# Patient Record
Sex: MALE | Race: WHITE | URBAN - METROPOLITAN AREA
[De-identification: names, ages, dates, MRNs, and addresses within clinical notes are randomized per-mention and may not be internally consistent; named-entity substitution may affect disease eponyms.]

---

## 2017-08-12 ENCOUNTER — HOSPITAL ENCOUNTER (OUTPATIENT)
Dept: ONCOLOGY | Facility: HOSPITAL | Age: 77
End: 2017-08-13
Attending: FAMILY MEDICINE | Admitting: FAMILY MEDICINE

## 2017-08-12 LAB
ABS NEUT (OLG): 7.37 X10(3)/MCL (ref 2.1–9.2)
ALBUMIN SERPL-MCNC: 3.1 GM/DL (ref 3.4–5)
ALBUMIN/GLOB SERPL: 1.1 {RATIO}
ALP SERPL-CCNC: 68 UNIT/L (ref 50–136)
ALT SERPL-CCNC: 11 UNIT/L (ref 12–78)
APPEARANCE, UA: ABNORMAL
AST SERPL-CCNC: 9 UNIT/L (ref 15–37)
BACTERIA SPEC CULT: ABNORMAL /HPF
BASOPHILS # BLD AUTO: 0 X10(3)/MCL (ref 0–0.2)
BASOPHILS NFR BLD AUTO: 0 %
BILIRUB SERPL-MCNC: 0.4 MG/DL (ref 0.2–1)
BILIRUB UR QL STRIP: NEGATIVE
BILIRUBIN DIRECT+TOT PNL SERPL-MCNC: 0.1 MG/DL (ref 0–0.2)
BILIRUBIN DIRECT+TOT PNL SERPL-MCNC: 0.3 MG/DL (ref 0–0.8)
BUN SERPL-MCNC: 12 MG/DL (ref 7–18)
CALCIUM SERPL-MCNC: 8.2 MG/DL (ref 8.5–10.1)
CHLORIDE SERPL-SCNC: 109 MMOL/L (ref 98–107)
CO2 SERPL-SCNC: 26 MMOL/L (ref 21–32)
COLOR UR: ABNORMAL
CREAT SERPL-MCNC: 0.73 MG/DL (ref 0.7–1.3)
EOSINOPHIL # BLD AUTO: 0.1 X10(3)/MCL (ref 0–0.9)
EOSINOPHIL NFR BLD AUTO: 1 %
ERYTHROCYTE [DISTWIDTH] IN BLOOD BY AUTOMATED COUNT: 15.2 % (ref 11.5–17)
GLOBULIN SER-MCNC: 2.7 GM/DL (ref 2.4–3.5)
GLUCOSE (UA): NEGATIVE
GLUCOSE SERPL-MCNC: 119 MG/DL (ref 74–106)
GROUP & RH: NORMAL
HCT VFR BLD AUTO: 32.4 % (ref 42–52)
HGB BLD-MCNC: 10.6 GM/DL (ref 14–18)
HGB UR QL STRIP: NEGATIVE
KETONES UR QL STRIP: NEGATIVE
LEUKOCYTE ESTERASE UR QL STRIP: ABNORMAL
LYMPHOCYTES # BLD AUTO: 0.8 X10(3)/MCL (ref 0.6–4.6)
LYMPHOCYTES NFR BLD AUTO: 9 %
MCH RBC QN AUTO: 32 PG (ref 27–31)
MCHC RBC AUTO-ENTMCNC: 32.7 GM/DL (ref 33–36)
MCV RBC AUTO: 97.9 FL (ref 80–94)
MONOCYTES # BLD AUTO: 0.5 X10(3)/MCL (ref 0.1–1.3)
MONOCYTES NFR BLD AUTO: 6 %
NEUTROPHILS # BLD AUTO: 7.37 X10(3)/MCL (ref 1.4–7.9)
NEUTROPHILS NFR BLD AUTO: 84 %
NITRITE UR QL STRIP: NEGATIVE
PH UR STRIP: 6 [PH] (ref 5–9)
PLATELET # BLD AUTO: 138 X10(3)/MCL (ref 130–400)
PMV BLD AUTO: 8.3 FL (ref 9.4–12.4)
POTASSIUM SERPL-SCNC: 3.6 MMOL/L (ref 3.5–5.1)
PRODUCT READY: NORMAL
PROT SERPL-MCNC: 5.8 GM/DL (ref 6.4–8.2)
PROT UR QL STRIP: NEGATIVE
RBC # BLD AUTO: 3.31 X10(6)/MCL (ref 4.7–6.1)
RBC #/AREA URNS HPF: 232 /HPF (ref 0–2)
SODIUM SERPL-SCNC: 143 MMOL/L (ref 136–145)
SP GR UR STRIP: 1.02 (ref 1–1.03)
SQUAMOUS EPITHELIAL, UA: ABNORMAL
TROPONIN I SERPL-MCNC: <0.02 NG/ML (ref 0.02–0.49)
UROBILINOGEN UR STRIP-ACNC: 0.2
WBC # SPEC AUTO: 8.8 X10(3)/MCL (ref 4.5–11.5)
WBC #/AREA URNS HPF: ABNORMAL /HPF

## 2017-08-13 LAB
ABS NEUT (OLG): 3.53 X10(3)/MCL (ref 2.1–9.2)
ALBUMIN SERPL-MCNC: 2.8 GM/DL (ref 3.4–5)
ALBUMIN/GLOB SERPL: 1.2 {RATIO}
ALP SERPL-CCNC: 63 UNIT/L (ref 50–136)
ALT SERPL-CCNC: 9 UNIT/L (ref 12–78)
AST SERPL-CCNC: 13 UNIT/L (ref 15–37)
BASOPHILS # BLD AUTO: 0 X10(3)/MCL (ref 0–0.2)
BASOPHILS NFR BLD AUTO: 0 %
BILIRUB SERPL-MCNC: 0.5 MG/DL (ref 0.2–1)
BILIRUBIN DIRECT+TOT PNL SERPL-MCNC: 0.1 MG/DL (ref 0–0.2)
BILIRUBIN DIRECT+TOT PNL SERPL-MCNC: 0.4 MG/DL (ref 0–0.8)
BUN SERPL-MCNC: 7 MG/DL (ref 7–18)
CALCIUM SERPL-MCNC: 7.9 MG/DL (ref 8.5–10.1)
CHLORIDE SERPL-SCNC: 110 MMOL/L (ref 98–107)
CO2 SERPL-SCNC: 26 MMOL/L (ref 21–32)
CREAT SERPL-MCNC: 0.52 MG/DL (ref 0.7–1.3)
EOSINOPHIL # BLD AUTO: 0.1 X10(3)/MCL (ref 0–0.9)
EOSINOPHIL NFR BLD AUTO: 2 %
ERYTHROCYTE [DISTWIDTH] IN BLOOD BY AUTOMATED COUNT: 15 % (ref 11.5–17)
GLOBULIN SER-MCNC: 2.3 GM/DL (ref 2.4–3.5)
GLUCOSE SERPL-MCNC: 87 MG/DL (ref 74–106)
HCT VFR BLD AUTO: 32 % (ref 42–52)
HGB BLD-MCNC: 10.1 GM/DL (ref 14–18)
LYMPHOCYTES # BLD AUTO: 1 X10(3)/MCL (ref 0.6–4.6)
LYMPHOCYTES NFR BLD AUTO: 19 %
MCH RBC QN AUTO: 30 PG (ref 27–31)
MCHC RBC AUTO-ENTMCNC: 31.6 GM/DL (ref 33–36)
MCV RBC AUTO: 95 FL (ref 80–94)
MONOCYTES # BLD AUTO: 0.4 X10(3)/MCL (ref 0.1–1.3)
MONOCYTES NFR BLD AUTO: 8 %
NEUTROPHILS # BLD AUTO: 3.53 X10(3)/MCL (ref 2.1–9.2)
NEUTROPHILS NFR BLD AUTO: 70 %
PLATELET # BLD AUTO: 132 X10(3)/MCL (ref 130–400)
PMV BLD AUTO: 8.6 FL (ref 9.4–12.4)
POTASSIUM SERPL-SCNC: 3.9 MMOL/L (ref 3.5–5.1)
PROT SERPL-MCNC: 5.1 GM/DL (ref 6.4–8.2)
RBC # BLD AUTO: 3.37 X10(6)/MCL (ref 4.7–6.1)
SODIUM SERPL-SCNC: 143 MMOL/L (ref 136–145)
WBC # SPEC AUTO: 5 X10(3)/MCL (ref 4.5–11.5)

## 2017-08-28 ENCOUNTER — HISTORICAL (OUTPATIENT)
Dept: ENDOSCOPY | Facility: HOSPITAL | Age: 77
End: 2017-08-28

## 2017-10-30 ENCOUNTER — HISTORICAL (OUTPATIENT)
Dept: RADIOLOGY | Facility: HOSPITAL | Age: 77
End: 2017-10-30

## 2017-10-30 LAB — POC CREATININE: 0.7 MG/DL (ref 0.6–1.3)

## 2019-05-22 ENCOUNTER — HISTORICAL (OUTPATIENT)
Dept: ADMINISTRATIVE | Facility: HOSPITAL | Age: 79
End: 2019-05-22

## 2019-05-22 LAB
ABS NEUT (OLG): 3.59 X10(3)/MCL (ref 2.1–9.2)
ALBUMIN SERPL-MCNC: 2.9 GM/DL (ref 3.4–5)
ALBUMIN/GLOB SERPL: 1 RATIO (ref 1–2)
ALP SERPL-CCNC: 125 UNIT/L (ref 45–117)
ALT SERPL-CCNC: 12 UNIT/L (ref 16–61)
AST SERPL-CCNC: 10 UNIT/L (ref 15–37)
BASOPHILS # BLD AUTO: 0.02 X10(3)/MCL (ref 0–0.2)
BASOPHILS NFR BLD AUTO: 0.3 % (ref 0–0.9)
BILIRUB SERPL-MCNC: 0.3 MG/DL (ref 0.2–1)
BILIRUBIN DIRECT+TOT PNL SERPL-MCNC: 0.11 MG/DL (ref 0–0.2)
BILIRUBIN DIRECT+TOT PNL SERPL-MCNC: 0.19 MG/DL (ref 0–1)
BUN SERPL-MCNC: 19 MG/DL (ref 7–18)
CALCIUM SERPL-MCNC: 8 MG/DL (ref 8.5–10.1)
CHLORIDE SERPL-SCNC: 113 MMOL/L (ref 98–107)
CO2 SERPL-SCNC: 29 MMOL/L (ref 21–32)
CREAT SERPL-MCNC: 0.8 MG/DL (ref 0.7–1.3)
DEPRECATED CALCIDIOL+CALCIFEROL SERPL-MC: 30.65 NG/ML (ref 30–80)
EOSINOPHIL # BLD AUTO: 0.22 X10(3)/MCL (ref 0–0.9)
EOSINOPHIL NFR BLD AUTO: 3.8 % (ref 0–6.5)
ERYTHROCYTE [DISTWIDTH] IN BLOOD BY AUTOMATED COUNT: 16 % (ref 11.5–17)
GLOBULIN SER-MCNC: 3 GM/DL (ref 2–4)
GLUCOSE SERPL-MCNC: 86 MG/DL (ref 74–106)
HCT VFR BLD AUTO: 31 % (ref 42–52)
HGB BLD-MCNC: 9.6 GM/DL (ref 14–18)
IMM GRANULOCYTES # BLD AUTO: 0.04 10*3/UL (ref 0–0.02)
IMM GRANULOCYTES NFR BLD AUTO: 0.7 % (ref 0–0.43)
LYMPHOCYTES # BLD AUTO: 1.48 X10(3)/MCL (ref 0.6–4.6)
LYMPHOCYTES NFR BLD AUTO: 25.3 % (ref 16.2–38.3)
MCH RBC QN AUTO: 30.9 PG (ref 27–31)
MCHC RBC AUTO-ENTMCNC: 31 GM/DL (ref 33–36)
MCV RBC AUTO: 99.7 FL (ref 80–94)
MONOCYTES # BLD AUTO: 0.49 X10(3)/MCL (ref 0.1–1.3)
MONOCYTES NFR BLD AUTO: 8.4 % (ref 4.7–11.3)
NEUTROPHILS # BLD AUTO: 3.59 X10(3)/MCL (ref 2.1–9.2)
NEUTROPHILS NFR BLD AUTO: 61.5 % (ref 49.1–73.4)
NRBC BLD AUTO-RTO: 0 % (ref 0–0.2)
PLATELET # BLD AUTO: 154 X10(3)/MCL (ref 130–400)
PMV BLD AUTO: 8.7 FL (ref 7.4–10.4)
POTASSIUM SERPL-SCNC: 3.6 MMOL/L (ref 3.5–5.1)
PREALB SERPL-MCNC: 18.7 MG/DL (ref 20–40)
PROT SERPL-MCNC: 6.1 GM/DL (ref 6.4–8.2)
RBC # BLD AUTO: 3.11 X10(6)/MCL (ref 4.7–6.1)
SODIUM SERPL-SCNC: 147 MMOL/L (ref 136–145)
TSH SERPL-ACNC: 2.11 MIU/ML (ref 0.36–3.74)
WBC # SPEC AUTO: 5.8 X10(3)/MCL (ref 4.5–11.5)

## 2019-06-12 LAB
APPEARANCE, UA: ABNORMAL
BACTERIA SPEC CULT: ABNORMAL /HPF
BILIRUB UR QL STRIP: NEGATIVE
COLOR UR: YELLOW
GLUCOSE (UA): NEGATIVE
HGB UR QL STRIP: ABNORMAL
KETONES UR QL STRIP: ABNORMAL
LEUKOCYTE ESTERASE UR QL STRIP: ABNORMAL
NITRITE UR QL STRIP: POSITIVE
PH UR STRIP: 6 [PH] (ref 5–7)
PROT UR QL STRIP: ABNORMAL
RBC #/AREA URNS HPF: ABNORMAL /HPF
SP GR UR STRIP: 1.02 (ref 1–1.03)
SQUAMOUS EPITHELIAL, UA: ABNORMAL /LPF
UROBILINOGEN UR STRIP-ACNC: NEGATIVE
WBC #/AREA URNS HPF: ABNORMAL /HPF

## 2019-06-13 ENCOUNTER — HISTORICAL (OUTPATIENT)
Dept: ADMINISTRATIVE | Facility: HOSPITAL | Age: 79
End: 2019-06-13

## 2019-07-01 ENCOUNTER — HISTORICAL (OUTPATIENT)
Dept: ADMINISTRATIVE | Facility: HOSPITAL | Age: 79
End: 2019-07-01

## 2019-07-01 LAB
ABS NEUT (OLG): 4.49 X10(3)/MCL (ref 2.1–9.2)
BASOPHILS # BLD AUTO: 0.03 X10(3)/MCL (ref 0–0.2)
BASOPHILS NFR BLD AUTO: 0.5 % (ref 0–0.9)
EOSINOPHIL # BLD AUTO: 0.11 X10(3)/MCL (ref 0–0.9)
EOSINOPHIL NFR BLD AUTO: 1.7 % (ref 0–6.5)
ERYTHROCYTE [DISTWIDTH] IN BLOOD BY AUTOMATED COUNT: 14.4 % (ref 11.5–17)
HCT VFR BLD AUTO: 33.1 % (ref 42–52)
HGB BLD-MCNC: 10.5 GM/DL (ref 14–18)
IMM GRANULOCYTES # BLD AUTO: 0.02 10*3/UL (ref 0–0.02)
IMM GRANULOCYTES NFR BLD AUTO: 0.3 % (ref 0–0.43)
LYMPHOCYTES # BLD AUTO: 1.42 X10(3)/MCL (ref 0.6–4.6)
LYMPHOCYTES NFR BLD AUTO: 21.4 % (ref 16.2–38.3)
MCH RBC QN AUTO: 30.9 PG (ref 27–31)
MCHC RBC AUTO-ENTMCNC: 31.7 GM/DL (ref 33–36)
MCV RBC AUTO: 97.4 FL (ref 80–94)
MONOCYTES # BLD AUTO: 0.57 X10(3)/MCL (ref 0.1–1.3)
MONOCYTES NFR BLD AUTO: 8.6 % (ref 4.7–11.3)
NEUTROPHILS # BLD AUTO: 4.49 X10(3)/MCL (ref 2.1–9.2)
NEUTROPHILS NFR BLD AUTO: 67.5 % (ref 49.1–73.4)
NRBC BLD AUTO-RTO: 0 % (ref 0–0.2)
PLATELET # BLD AUTO: 187 X10(3)/MCL (ref 130–400)
PMV BLD AUTO: 8.9 FL (ref 7.4–10.4)
RBC # BLD AUTO: 3.4 X10(6)/MCL (ref 4.7–6.1)
WBC # SPEC AUTO: 6.6 X10(3)/MCL (ref 4.5–11.5)

## 2019-09-25 ENCOUNTER — HISTORICAL (OUTPATIENT)
Dept: ADMINISTRATIVE | Facility: HOSPITAL | Age: 79
End: 2019-09-25

## 2019-09-25 LAB
ABS NEUT (OLG): 5.99 X10(3)/MCL (ref 2.1–9.2)
ALBUMIN SERPL-MCNC: 3.3 GM/DL (ref 3.4–5)
ALBUMIN/GLOB SERPL: 1.1 RATIO (ref 1–2)
ALP SERPL-CCNC: 109 UNIT/L (ref 45–117)
ALT SERPL-CCNC: 22 UNIT/L (ref 16–61)
AST SERPL-CCNC: 17 UNIT/L (ref 15–37)
BASOPHILS # BLD AUTO: 0.03 X10(3)/MCL (ref 0–0.2)
BASOPHILS NFR BLD AUTO: 0.4 % (ref 0–0.9)
BILIRUB SERPL-MCNC: 0.3 MG/DL (ref 0.2–1)
BILIRUBIN DIRECT+TOT PNL SERPL-MCNC: 0.11 MG/DL (ref 0–0.2)
BILIRUBIN DIRECT+TOT PNL SERPL-MCNC: 0.19 MG/DL (ref 0–1)
BUN SERPL-MCNC: 20 MG/DL (ref 7–18)
CALCIUM SERPL-MCNC: 8.9 MG/DL (ref 8.5–10.1)
CHLORIDE SERPL-SCNC: 106 MMOL/L (ref 98–107)
CO2 SERPL-SCNC: 31 MMOL/L (ref 21–32)
CREAT SERPL-MCNC: 0.84 MG/DL (ref 0.7–1.3)
EOSINOPHIL # BLD AUTO: 0.15 X10(3)/MCL (ref 0–0.9)
EOSINOPHIL NFR BLD AUTO: 2 % (ref 0–6.5)
ERYTHROCYTE [DISTWIDTH] IN BLOOD BY AUTOMATED COUNT: 14.3 % (ref 11.5–17)
GLOBULIN SER-MCNC: 3 GM/DL (ref 2–4)
GLUCOSE SERPL-MCNC: 88 MG/DL (ref 74–106)
HCT VFR BLD AUTO: 32.1 % (ref 42–52)
HGB BLD-MCNC: 10.1 GM/DL (ref 14–18)
IMM GRANULOCYTES # BLD AUTO: 0.04 10*3/UL (ref 0–0.02)
IMM GRANULOCYTES NFR BLD AUTO: 0.5 % (ref 0–0.43)
LYMPHOCYTES # BLD AUTO: 0.9 X10(3)/MCL (ref 0.6–4.6)
LYMPHOCYTES NFR BLD AUTO: 11.9 % (ref 16.2–38.3)
MCH RBC QN AUTO: 30.5 PG (ref 27–31)
MCHC RBC AUTO-ENTMCNC: 31.5 GM/DL (ref 33–36)
MCV RBC AUTO: 97 FL (ref 80–94)
MONOCYTES # BLD AUTO: 0.47 X10(3)/MCL (ref 0.1–1.3)
MONOCYTES NFR BLD AUTO: 6.2 % (ref 4.7–11.3)
NEUTROPHILS # BLD AUTO: 5.99 X10(3)/MCL (ref 2.1–9.2)
NEUTROPHILS NFR BLD AUTO: 79 % (ref 49.1–73.4)
NRBC BLD AUTO-RTO: 0 % (ref 0–0.2)
PLATELET # BLD AUTO: 189 X10(3)/MCL (ref 130–400)
PMV BLD AUTO: 8.4 FL (ref 7.4–10.4)
POTASSIUM SERPL-SCNC: 4 MMOL/L (ref 3.5–5.1)
PROT SERPL-MCNC: 6.3 GM/DL (ref 6.4–8.2)
RBC # BLD AUTO: 3.31 X10(6)/MCL (ref 4.7–6.1)
SODIUM SERPL-SCNC: 140 MMOL/L (ref 136–145)
WBC # SPEC AUTO: 7.6 X10(3)/MCL (ref 4.5–11.5)

## 2022-04-11 ENCOUNTER — HISTORICAL (OUTPATIENT)
Dept: ADMINISTRATIVE | Facility: HOSPITAL | Age: 82
End: 2022-04-11

## 2022-04-27 VITALS
BODY MASS INDEX: 20.18 KG/M2 | DIASTOLIC BLOOD PRESSURE: 64 MMHG | WEIGHT: 133.19 LBS | HEIGHT: 68 IN | SYSTOLIC BLOOD PRESSURE: 90 MMHG

## 2022-04-30 NOTE — DISCHARGE SUMMARY
DISCHARGE DATE:  08/13/2017    HOSPITAL COURSE:  The patient was placed on outpatient observation 08/12/17 and I am discharging him on 08/13/17.  He is a 76-year-old man who had an episode of hypotension and dizziness at [facility].  He has a recent history of GI bleed with gastric ulcer.  Every since his arrival in the emergency room, throughout overnight observation, and this morning, his blood pressures have been fine; systolics in the 140s to 160s.  He has had no further episodes of hypotension, no signs of symptoms of GI bleeding and hemoglobin and hematocrit have remained steady at 10 and 30 respectively.  Vital signs have been stable.  He is being discharged back to [facility].    FINAL DIAGNOSES:    1. Episodic hypotension and dizziness, which is transient and resolved.  Etiology undetermined.   2. Pre-existing gastric ulcer.  3. Anemia.  4. Parkinson's disease.    DISCHARGE INSTRUCTIONS:  He is being discharged back on all of his usual medications with no new orders.  He has a history of urinary retention and was unable to void, therefore a Azevedo catheter was reinserted.  I am leaving that in place and they can try to remove it again once he gets back to the nursing home.    MEDICATIONS:    1. Amlodipine, 5 mg a day.  2. Carbidopa/levodopa, 25/100, one twice a day.  3. Diclofenac, 1% topical gel prn.  4. Lisinopril, 10 mg daily.  5. Protonix, 40 mg daily.  6. Ropurinol, 0.25mg once a day at bedtime.  7. Sucralfate suspension, 10 mL four times a day before meals and bedtime.  8. Flomax, 0.4 mg daily.  9.   Restoril, 7.5 mg prn at bedtime.  10. Tramadol, 50 mg every 4 hours prn pain.        ______________________________  L. MD ALEN Leyva/GOVIND  DD:  08/13/2017  Time:  10:00AM  DT:  08/13/2017  Time:  02:24PM  Job #:  [id number]    cc: [facility]   Carlos Aguila MD

## 2022-04-30 NOTE — ED PROVIDER NOTES
Patient:   Patric Posadas            MRN: [id number]            FIN: [id number]               Age:   76 years     Sex:  Male     :  1940   Associated Diagnoses:   Hypotension; Hypotension; Dizziness; Anemia   Author:   Guicho LOGAN, Sudhakar BRIAN      Basic Information   Time seen: Date & time 2017 11:37:00.   History source: Patient.   Arrival mode: Ambulance.   History limitation: None.   Additional information: Chief Complaint from Nursing Triage Note : Chief Complaint   2017 11:26 CDT      Chief Complaint           dizzy, low blood pressure 82/46, cbg 127, pt reports recent admit for GI bleed and another UTI  .      History of Present Illness   The patient presents with dizziness, 75 YO WM in by EMS with hx of parkinsons dementia, HTN, rectal bleed admitted here 17 presents with weakness and dizziness at nursing home this am. EMS reports hypotensive 80/40. recieved 250 NS, BP now 122/70. . Yakelin Ross, Np SORt NOTE and   I, Dr. Lindo, assumed care of this patient upon entering the room at 1136.    76 year old male with history of HTN, Decubitus, Parkinson's, Dementia, and GI bleed 1 month ago presents to ED via EMS from nursing home after experiencing lightheadedness and dizziness during PT. EMS reports BP 80/40 initially and . Patient denies abdominal pain or melena. .  The onset was just prior to arrival.  The course/duration of symptoms is constant.  The character of symptoms is lightheaded and Dizziness.  The degree at present is moderate.  The exacerbating factor is none.  The relieving factor is none.  Risk factors consist of hypertension and recent hospitalization (GI bleed).  Prior episodes: none.  Therapy today: emergency medical services.  Associated symptoms: No melena and denies abdominal pain.  Additional history: none.        Review of Systems   Constitutional symptoms:  Negative except as documented in HPI.   Skin symptoms:  Negative except as  documented in HPI.   Eye symptoms:  Negative except as documented in HPI.   ENMT symptoms:  Negative except as documented in HPI.   Respiratory symptoms:  Negative except as documented in HPI.   Cardiovascular symptoms:  Negative except as documented in HPI.   Gastrointestinal symptoms:  No melena, No abdominal pain,    Genitourinary symptoms:  Negative except as documented in HPI.   Musculoskeletal symptoms:  Negative except as documented in HPI.   Neurologic symptoms:  Dizziness, Lightheadedness.    Psychiatric symptoms:  Negative except as documented in HPI.   Endocrine symptoms:  Negative except as documented in HPI.   Hematologic/Lymphatic symptoms:  Negative except as documented in HPI.   Allergy/immunologic symptoms:  Negative except as documented in HPI.             Additional review of systems information: All other systems reviewed and otherwise negative.      Health Status   Allergies: No known allergies.   Medications:  (Selected)   Inpatient Medications  Ordered  Normal Saline (0.9% NS) IV 1,000 mL: 1,000 mL, 1,000 mL, IV, 500 mL/hr, start date 08/12/17 11:36:00 CDT  Tylenol: 1,000 mg, form: Tab, Oral, Once, first dose 08/07/17 12:58:00 CDT, stop date 08/07/17 12:58:00 CDT, STAT, 24  Documented Medications  Documented  Carafate 1 g/10 mL oral suspension: 1 gm = 10 mL, Oral, AC & HS, 0 Refill(s)  Desitin 40% topical ointment: 1 joel, TOP, BID, 0 Refill(s)  Flomax 0.4 mg oral capsule: 0.4 mg = 1 cap(s), Oral, Daily, 0 Refill(s)  LISINOPRIL 10 MG TABS: 10 mg = 1 tab(s), Oral, Daily  Norvasc 5 mg oral tablet: 5 mg = 1 tab(s), Oral, Daily, 0 Refill(s)  Protonix 40 mg ORAL enteric coated tablet: 40 mg = 1 tab(s), Oral, Daily, 0 Refill(s)  Restoril 7.5 mg oral capsule: 7.5 mg = 1 cap(s), Oral, Once a day (at bedtime), PRN PRN as needed for sleep, 0 Refill(s)  Sinemet 25 mg-100 mg oral tablet: 1 tab(s), Oral, BIDAC, 0 Refill(s)  Zeasorb-AF: 1 joel, TOP, BID, 0 Refill(s)  bisacodyl 10 mg rectal solution: 10 mg = 1  EA, MD, Daily, PRN PRN constipation, # 37.5 mL, 0 Refill(s)  diclofenac 1% topical gel: TOP, BID, 0 Refill(s)  ropinirole 0.25 mg oral tablet: 0.25 mg = 1 tab(s), Oral, Once a day (at bedtime), 0 Refill(s)  traMADol 50 mg oral tablet: 50 mg = 1 tab(s), Oral, q4hr, PRN PRN for pain, 0 Refill(s).   Immunizations: Per nurse's notes.      Past Medical/ Family/ Social History   Medical history:    No active or resolved past medical history items have been selected or recorded., Decubitus  Dementia  Parkinson's   HTN  GI bleed.   Surgical history:    Esophagogastroduodenoscopy on 7/3/2017 at 76 Years.  Comments:  7/3/2017 12:18 - Tyree Smith RN  auto-populated from documented surgical case  Biopsy Gastrointestinal on 7/3/2017 at 76 Years.  Comments:  7/3/2017 12:Tyree Blanchard RN  auto-populated from documented surgical case  Laparoscopic sigmoid colectomy ([id number])..   Family history: Not significant.   Social history: Alcohol use: Denies, Tobacco use: Denies, Drug use: Denies, Occupation: Retired, Family/social situation: Nursing home resident.      Physical Examination               Vital Signs   Vital Signs   8/12/2017 11:35 CDT      Peripheral Pulse Rate     77 bpm                             Heart Rate Monitored      78 bpm                             Respiratory Rate          17 br/min                             SpO2                      100 %                             Oxygen Therapy            Room air                             Systolic Blood Pressure   120 mmHg                             Diastolic Blood Pressure  66 mmHg                             Mean Arterial Pressure, Cuff              84 mmHg    8/12/2017 11:26 CDT      Temperature Temporal Artery               37.2 DegC                             Peripheral Pulse Rate     72 bpm                             Respiratory Rate          20 br/min                             SpO2                      100 %                             Oxygen  Therapy            Room air                             Systolic Blood Pressure   100 mmHg                             Diastolic Blood Pressure  42 mmHg  LOW  .   Measurements   8/12/2017 11:26 CDT      Weight Dosing             49 kg                             Weight Measured and Calculated in Lbs     108.03 lb                             Weight Estimated          49 kg                             Height/Length Dosing      172 cm                             Height/Length Estimated   172 cm                             Body Mass Index Estimated 16.56 kg/m2  .   Basic Oxygen Information   8/12/2017 11:35 CDT      SpO2                      100 %                             Oxygen Therapy            Room air    8/12/2017 11:26 CDT      SpO2                      100 %                             Oxygen Therapy            Room air  .   General:  Alert, no acute distress.    Skin:  Warm, dry, stage 2 sacral decubitus.    Head:  Normocephalic, atraumatic.    Neck:  Supple, trachea midline.    Eye:  Pupils are equal, round and reactive to light, extraocular movements are intact, normal conjunctiva.    Ears, nose, mouth and throat:  Oral mucosa moist.   Cardiovascular:  Regular rate and rhythm, No murmur, Normal peripheral perfusion, No edema.    Respiratory:  Lungs are clear to auscultation, respirations are non-labored, breath sounds are equal, Symmetrical chest wall expansion.    Chest wall:  No tenderness.   Musculoskeletal:  Normal ROM, normal strength, no tenderness, no swelling, no deformity.    Gastrointestinal:  Soft, Nontender, Non distended, Hemoccult: negative (brown stool).    Neurological:  Alert and oriented to person, place, time, and situation, No focal neurological deficit observed, CN II-XII intact, normal sensory observed, normal motor observed, normal speech observed, normal coordination observed.    Psychiatric:  Cooperative, appropriate mood & affect, normal judgment.       Medical Decision Making    Documents reviewed:  Emergency department nurses' notes.   Orders  Launch Order Profile (Selected)   Inpatient Orders  Ordered  EK17 11:35:00 CDT, Stat, Once, 24, 17 11:35:00 CDT, -1, -1, 17 11:35:00 CDT  Normal Saline (0.9% NS) IV 1,000 mL: 1,000 mL, 1,000 mL, IV, 500 mL/hr, start date 17 11:36:00 CDT  Ordered (Dispatched)  UA a reflex to culture: Stat collect, Urine, 17 11:35:00 CDT, Stop date 17 11:36:00 CDT, Nurse collect, Print Label By Order Location  Ordered (In-Lab)  Automated Diff: Stat collect, 17 11:53:00 CDT, Blood, Collected, Stop date 17 11:53:00 CDT, Lab Collect, Print Label By Order Location, 17 11:35:00 CDT  CBC - includes Diff: Stat collect, 17 11:35:00 CDT, Blood, Stop date 17 11:36:00 CDT, Lab Collect, Print Label By Order Location, 17 11:35:00 CDT  CMP: Stat collect, 17 11:35:00 CDT, Blood, Stop date 17 11:36:00 CDT, Lab Collect, Print Label By Order Location, 17 11:35:00 CDT  Troponin-I: Stat collect, 17 11:39:00 CDT, Blood, Stop date 17 11:39:00 CDT, Lab Collect, Print Label By Order Location, 17 11:39:00 CDT.   Electrocardiogram:  Time 2017 11:39:00, rate 70, normal sinus rhythm, No ST-T changes, no ectopy, normal DC & QRS intervals, EP Interp, flat T waves diffusely.    Results review:  Lab results : Lab View   2017 12:40 CDT      UA Appear                 CLOUDY                             UA Color                  MORGAN                             UA Spec Grav              1.018                             UA Bili                   Negative                             UA pH                     6.0                             UA Urobilinogen           0.2                             UA Blood                  Negative                             UA Glucose                Negative                             UA Ketones                Negative                              UA Protein                Negative                             UA Nitrite                Negative                             UA Leuk Est               Trace                             UA WBC                    NONE SEEN /HPF                             UA RBC                    232 /HPF                             UA Bacteria               NONE SEEN /HPF                             UA Squam Epithelial       NONE SEEN    8/12/2017 11:53 CDT      Sodium Lvl                143 mmol/L                             Potassium Lvl             3.6 mmol/L                             Chloride                  109 mmol/L  HI                             CO2                       26.0 mmol/L                             Calcium Lvl               8.2 mg/dL  LOW                             Glucose Lvl               119 mg/dL  HI                             BUN                       12.0 mg/dL                             Creatinine                0.73 mg/dL                             eGFR-AA                   >60 mL/min/1.73 m2  NA                             eGFR-MARYURI                  >60 mL/min/1.73 m2  NA                             Bili Total                0.4 mg/dL                             Bili Direct               0.10 mg/dL                             Bili Indirect             0.30 mg/dL                             AST                       9 unit/L  LOW                             ALT                       11 unit/L  LOW                             Alk Phos                  68 unit/L                             Total Protein             5.8 gm/dL  LOW                             Albumin Lvl               3.10 gm/dL  LOW                             Globulin                  2.70 gm/dL                             A/G Ratio                 1.1  NA                             Troponin-I                <0.02 ng/mL                             WBC                       8.8 x10(3)/mcL                             RBC                        3.31 x10(6)/mcL  LOW                             Hgb                       10.6 gm/dL  LOW                             Hct                       32.4 %  LOW                             Platelet                  138 x10(3)/mcL                             MCV                       97.9 fL  HI                             MCH                       32.0 pg  HI                             MCHC                      32.7 gm/dL  LOW                             RDW                       15.2 %                             MPV                       8.3 fL  LOW                             Abs Neut                  7.37 x10(3)/mcL                             Neutro Auto               84 %  NA                             Lymph Auto                9 %  NA                             Mono Auto                 6 %  NA                             Eos Auto                  1 %  NA                             Abs Eos                   0.1 x10(3)/mcL                             Basophil Auto             0 %  NA                             Abs Neutro                7.37 x10(3)/mcL                             Abs Lymph                 0.8 x10(3)/mcL                             Abs Mono                  0.5 x10(3)/mcL                             Abs Baso                  0.0 x10(3)/mcL  .      Reexamination/ Reevaluation   Course: improving.   Notes: The patient has a history of a recent GI bleed and a large gastric ulcer his H&H is lower today than it was on discharge however hysterically his H&H today is basically consistent with his baseline.  The patient is Hemoccult negative on examination and his abdomen is nontender his blood pressure is been stable since being here.  However given his history of anemia and a large gastric ulcer with a drop in his H&H we will keep and repeat an H&H in the morning.  I will type and screen him to put on hold in case he needs transfusion.  We did give him Protonix here in the emergency  department.      Impression and Plan   Diagnosis   Hypotension (UBI10-ME I95.9)   Hypotension (PNED [id number])   Dizziness (PNED [id number])   Anemia (ZEJ74-PV D64.9)   Plan   Condition: Stable.    Disposition: Place in Observation Telemetry Unit.    Counseled: Patient, Regarding diagnosis, Regarding diagnostic results, Regarding treatment plan, Patient indicated understanding of instructions.    Notes: I, Kinza Ray, acted solely as a scribe for and in the presence of Dr. Lindo who performed the service., I, Lionel Johnson, acted solely as a scribe for and in the presence of Dr. Lindo who performed the service..       Addendum      Teaching-Supervisory Addendum-Brief   Notes: I, Dr. Lindo, personally performed the services described in this documentation as scribed in my presence and it is both accurate and complete..